# Patient Record
Sex: MALE | Race: OTHER | URBAN - METROPOLITAN AREA
[De-identification: names, ages, dates, MRNs, and addresses within clinical notes are randomized per-mention and may not be internally consistent; named-entity substitution may affect disease eponyms.]

---

## 2021-08-28 ENCOUNTER — TELEPHONE ENCOUNTER (OUTPATIENT)
Dept: URBAN - METROPOLITAN AREA CLINIC 13 | Facility: CLINIC | Age: 61
End: 2021-08-28

## 2021-08-29 ENCOUNTER — TELEPHONE ENCOUNTER (OUTPATIENT)
Dept: URBAN - METROPOLITAN AREA CLINIC 13 | Facility: CLINIC | Age: 61
End: 2021-08-29

## 2023-05-04 ENCOUNTER — LAB OUTSIDE AN ENCOUNTER (OUTPATIENT)
Dept: URBAN - NONMETROPOLITAN AREA CLINIC 4 | Facility: CLINIC | Age: 63
End: 2023-05-04

## 2023-05-04 ENCOUNTER — WEB ENCOUNTER (OUTPATIENT)
Dept: URBAN - NONMETROPOLITAN AREA CLINIC 4 | Facility: CLINIC | Age: 63
End: 2023-05-04

## 2023-05-04 ENCOUNTER — OFFICE VISIT (OUTPATIENT)
Dept: URBAN - NONMETROPOLITAN AREA CLINIC 4 | Facility: CLINIC | Age: 63
End: 2023-05-04
Payer: MEDICARE

## 2023-05-04 ENCOUNTER — DASHBOARD ENCOUNTERS (OUTPATIENT)
Age: 63
End: 2023-05-04

## 2023-05-04 VITALS — BODY MASS INDEX: 29.73 KG/M2 | HEIGHT: 67 IN | WEIGHT: 189.4 LBS | TEMPERATURE: 97.9 F

## 2023-05-04 DIAGNOSIS — R07.9 CHEST PAIN, UNSPECIFIED TYPE: ICD-10-CM

## 2023-05-04 DIAGNOSIS — R10.84 GENERALIZED ABDOMINAL PAIN: ICD-10-CM

## 2023-05-04 DIAGNOSIS — K42.9 UMBILICAL HERNIA WITHOUT OBSTRUCTION AND WITHOUT GANGRENE: ICD-10-CM

## 2023-05-04 DIAGNOSIS — K21.9 GASTROESOPHAGEAL REFLUX DISEASE, UNSPECIFIED WHETHER ESOPHAGITIS PRESENT: ICD-10-CM

## 2023-05-04 DIAGNOSIS — R13.10 DYSPHAGIA, UNSPECIFIED TYPE: ICD-10-CM

## 2023-05-04 DIAGNOSIS — K44.9 HIATAL HERNIA: ICD-10-CM

## 2023-05-04 DIAGNOSIS — R14.0 ABDOMINAL BLOATING: ICD-10-CM

## 2023-05-04 PROBLEM — 40739000: Status: ACTIVE | Noted: 2023-05-04

## 2023-05-04 PROBLEM — 235595009: Status: ACTIVE | Noted: 2023-05-04

## 2023-05-04 PROCEDURE — 99204 OFFICE O/P NEW MOD 45 MIN: CPT | Performed by: REGISTERED NURSE

## 2023-05-04 RX ORDER — DOXYCYCLINE HYCLATE 100 MG/1
1 CAPSULE CAPSULE, GELATIN COATED ORAL ONCE A DAY
Status: ACTIVE | COMMUNITY

## 2023-05-04 RX ORDER — BENZONATATE 100 MG/1
1 CAPSULE AS NEEDED CAPSULE ORAL THREE TIMES A DAY
Status: ACTIVE | COMMUNITY

## 2023-05-04 RX ORDER — PREDNISONE 20 MG/1
1 TABLET TABLET ORAL ONCE A DAY
Status: ACTIVE | COMMUNITY

## 2023-05-04 NOTE — HPI-TODAY'S VISIT:
5/4/23: Pt is a 61 yo male with PMH of DM 2, COPD, CHF, HTN, HLD, CVA/TIA, recent COVID diagnosis and chronic pain who was referred by [facility] ED for follow up on chest pain.   Pt recently seen in ED on 4/30/23 with c/o left sided chest pain that is sharp in nature radiating to bilateral shoulders with associated shortness of breath. No associated nausea/vomiting, lightheadedness/dizziness, numbness/tingling, or diaphoresis. Patient does not report any aggravating/alleviating symptoms. Patient with no recent stress testing. Patient does report nonproductive cough, but denies any fevers, change in vision, back pain, abdominal pain, change in bowel/bladder habits, or other symptoms. EKG shows sinus rhythm with PVCs, rate of 99, normal MT/QRS/QTc interval, normal axis, no evidence of STEMI or hyperacute T wave.Chest x-ray with no new focal consolidation, significant effusion, or pneumothorax. Laboratory work-up remarkable for no significant electrolyte derangements, hyperglycemia with no evidence of DKA, troponin of 7, and mild leukocytosis of 14.4 with no evidence of bandemia.   Most recent documented cardiac cath on 6/26/2020 showing less than 20% mild luminal irregularities.  Patient also recently obtained cardiac event monitor in which patient was supposed to wear for 30 days but only wore for 3 days showing primarily sinus rhythm with rare PACs/PVCs, no A-fib, VT, or pauses and 2 patient triggered events where rhythm was noted to be sinus rhythm.  Patient's most recent TTE on 2/25/2023 showing EF of 30 to 35%.  Since discharge, he has followed up with pulmonologist and cardiologist. He is scheduled for a NM stress test on 6/30/23. He was told he will need to be on Eliquis.   He reports chronic abdominal bloating and pain. He admits to eating beans and cabbage, sausage, chili hotdogs. He reports occasional constipaiton. Typically has a BM every 2-3 days. Uses stool softeners as needed. Denies N/V, melena or hematochezia. CT scan in April whowed small fat-containing umbilical hernia, but no acute intra-abdominal findings. He reports long-standing Hx of GERD and hiatal hernia. He takes Protonix 40 mg daily. Reports intermittent dysphagia to solids. 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 Last EGD/cscope in 2019 at [facility], [facility].

## 2023-06-26 ENCOUNTER — TELEPHONE ENCOUNTER (OUTPATIENT)
Dept: URBAN - NONMETROPOLITAN AREA CLINIC 4 | Facility: CLINIC | Age: 63
End: 2023-06-26

## 2023-07-11 ENCOUNTER — TELEPHONE ENCOUNTER (OUTPATIENT)
Dept: URBAN - NONMETROPOLITAN AREA CLINIC 4 | Facility: CLINIC | Age: 63
End: 2023-07-11

## 2023-07-25 ENCOUNTER — TELEPHONE ENCOUNTER (OUTPATIENT)
Dept: URBAN - NONMETROPOLITAN AREA CLINIC 4 | Facility: CLINIC | Age: 63
End: 2023-07-25

## 2023-08-02 ENCOUNTER — TELEPHONE ENCOUNTER (OUTPATIENT)
Dept: URBAN - NONMETROPOLITAN AREA CLINIC 4 | Facility: CLINIC | Age: 63
End: 2023-08-02

## 2023-08-19 NOTE — PHYSICAL EXAM PSYCH:
normal mood with appropriate affect
Skin normal color for race, warm, dry and intact. No evidence of rash.

## 2023-09-05 ENCOUNTER — OFFICE VISIT (OUTPATIENT)
Dept: URBAN - METROPOLITAN AREA MEDICAL CENTER 24 | Facility: MEDICAL CENTER | Age: 63
End: 2023-09-05
Payer: MEDICARE

## 2023-09-05 DIAGNOSIS — Z12.11 COLON CANCER SCREENING: ICD-10-CM

## 2023-09-05 PROCEDURE — 992 APS NON BILLABLE: Performed by: INTERNAL MEDICINE

## 2023-09-05 RX ORDER — BENZONATATE 100 MG/1
1 CAPSULE AS NEEDED CAPSULE ORAL THREE TIMES A DAY
Status: ACTIVE | COMMUNITY

## 2023-09-05 RX ORDER — DOXYCYCLINE HYCLATE 100 MG/1
1 CAPSULE CAPSULE, GELATIN COATED ORAL ONCE A DAY
Status: ACTIVE | COMMUNITY

## 2023-09-05 RX ORDER — PREDNISONE 20 MG/1
1 TABLET TABLET ORAL ONCE A DAY
Status: ACTIVE | COMMUNITY

## 2023-09-06 ENCOUNTER — TELEPHONE ENCOUNTER (OUTPATIENT)
Dept: URBAN - NONMETROPOLITAN AREA CLINIC 4 | Facility: CLINIC | Age: 63
End: 2023-09-06

## 2023-10-18 ENCOUNTER — TELEPHONE ENCOUNTER (OUTPATIENT)
Dept: URBAN - NONMETROPOLITAN AREA CLINIC 2 | Facility: CLINIC | Age: 63
End: 2023-10-18